# Patient Record
Sex: FEMALE | Race: WHITE
[De-identification: names, ages, dates, MRNs, and addresses within clinical notes are randomized per-mention and may not be internally consistent; named-entity substitution may affect disease eponyms.]

---

## 2018-05-17 ENCOUNTER — HOSPITAL ENCOUNTER (OUTPATIENT)
Dept: HOSPITAL 47 - RADECHMAIN | Age: 23
Discharge: HOME | End: 2018-05-17
Payer: COMMERCIAL

## 2018-05-17 DIAGNOSIS — R55: Primary | ICD-10-CM

## 2018-05-17 PROCEDURE — 93270 REMOTE 30 DAY ECG REV/REPORT: CPT

## 2018-05-17 PROCEDURE — 93271 ECG/MONITORING AND ANALYSIS: CPT

## 2018-06-04 NOTE — EM
EVENT MONITOR



Patient was monitored between May 17 and June 2, 2018.  The rhythm strip revealed sinus

mechanism with episode of sinus tachycardia.  There was no episodes of malignant

arrhythmia.





MMODL / IJN: [id number] / Job#: [id number]